# Patient Record
Sex: FEMALE | Race: WHITE | NOT HISPANIC OR LATINO | URBAN - METROPOLITAN AREA
[De-identification: names, ages, dates, MRNs, and addresses within clinical notes are randomized per-mention and may not be internally consistent; named-entity substitution may affect disease eponyms.]

---

## 2019-10-20 PROBLEM — Z01.419 WELL WOMAN EXAM: Status: ACTIVE | Noted: 2019-10-20

## 2019-10-23 ENCOUNTER — OFFICE VISIT (OUTPATIENT)
Dept: OBSTETRICS AND GYNECOLOGY | Facility: CLINIC | Age: 69
End: 2019-10-23

## 2019-10-23 VITALS — RESPIRATION RATE: 14 BRPM | SYSTOLIC BLOOD PRESSURE: 118 MMHG | DIASTOLIC BLOOD PRESSURE: 74 MMHG | WEIGHT: 146 LBS

## 2019-10-23 DIAGNOSIS — R35.0 URINARY FREQUENCY: ICD-10-CM

## 2019-10-23 DIAGNOSIS — N81.10 CYSTOCELE WITHOUT UTERINE PROLAPSE: Primary | ICD-10-CM

## 2019-10-23 PROBLEM — M85.89 OSTEOPENIA OF MULTIPLE SITES: Status: ACTIVE | Noted: 2017-02-08

## 2019-10-23 PROCEDURE — 99203 OFFICE O/P NEW LOW 30 MIN: CPT | Performed by: OBSTETRICS & GYNECOLOGY

## 2019-10-23 RX ORDER — MULTIVITAMIN
TABLET ORAL DAILY
COMMUNITY

## 2019-10-23 NOTE — PROGRESS NOTES
Subjective   Chief Complaint   Patient presents with   • Prolapsed bladder     Karen Napier is a 68 y.o. year old .  No LMP recorded (lmp unknown). Patient has had a hysterectomy.  She presents to be seen because of trouble she attributes to her prolapse.  The primary symptom she has is frequent nighttime urination.  She gets up several times during the evening.  This is been going on for many years now.  To date, no work-up or treatment has been tried.  She also notices prolapse at times.  She notices a slight protrusion through the vagina.  It is uncomfortable at times during the day.  Rarely leaks urine.  She generally does not need to lean forward to empty her bladder more frequently.  They are not sexually active.  She has some diverticular disease but no issues with colonic emptying.  Constipation is not a problem.    OTHER THINGS SHE WANTS TO DISCUSS TODAY:  Nothing else    The following portions of the patient's history were reviewed and updated as appropriate:current medications, allergies, past family history, past medical history, past social history and past surgical history    Social History    Tobacco Use      Smoking status: Never Smoker    Review of Systems  Constitutional POS: nothing reported    NEG: anorexia or night sweats   Genitourinary POS: see HPI    NEG: dysuria or hematuria   Gastointestinal POS: see HPI    NEG: bloating, change in bowel habits, melena or reflux symptoms   Integument POS: nothing reported    NEG: moles that are changing in size, shape, color or rashes   Breast POS: nothing reported    NEG: persistent breast lump, skin dimpling or nipple discharge         Objective   /74   Resp 14   Wt 66.2 kg (146 lb)   LMP  (LMP Unknown)   Breastfeeding? No     General:  well developed; well nourished  no acute distress   Pelvis: Clinical staff was present for exam  External genitalia:  normal appearance of the external genitalia including [place]th[place]'s and [place]'s glands.  :   urethral meatus normal;  Vaginal:  normal pink mucosa without prolapse or lesions.  Cervix:  absent.  Uterus:  absent.  Adnexa:  non palpable bilaterally.  Rectal:  digital rectal exam not performed; anus visually normal appearing.  Cystocele GRADE 3 without Valsalva; grade 4 with Valsalva  Rectocele GRADE 0  Vaginal vault prolapse GRADE 0     Lab Review   No data reviewed    Imaging   No data reviewed        Assessment   1. Normal GYN exam S/P hysterectomy with isolated cystocele  2. Urinary frequency with nocturia.  May be related to cystocele but may be unrelated     Plan   1. Trial of pessary  2. The following data needs to be obtained to update her medical records: last DEXA and last colonoscopy.  3. The importance of keeping all planned follow-up and taking all medications as prescribed was emphasized.  4. Follow up for pessary fitting          This note was electronically signed.    Brad Ware M.D.  October 23, 2019    Note: Speech recognition transcription software may have been used to create portions of this document.  An attempt at proofreading has been made but errors in transcription could still be present.

## 2019-11-18 ENCOUNTER — TELEPHONE (OUTPATIENT)
Dept: OBSTETRICS AND GYNECOLOGY | Facility: CLINIC | Age: 69
End: 2019-11-18

## 2019-11-18 NOTE — TELEPHONE ENCOUNTER
susan    Pt has a pessary, but has an ovarian screen in April at . Her question is, should she make an appt here some time before the ovarian screen to get pessary removed for that appt, or not?

## 2019-11-21 ENCOUNTER — OFFICE VISIT (OUTPATIENT)
Dept: OBSTETRICS AND GYNECOLOGY | Facility: CLINIC | Age: 69
End: 2019-11-21

## 2019-11-21 VITALS — DIASTOLIC BLOOD PRESSURE: 74 MMHG | WEIGHT: 146 LBS | RESPIRATION RATE: 14 BRPM | SYSTOLIC BLOOD PRESSURE: 122 MMHG

## 2019-11-21 DIAGNOSIS — N81.10 CYSTOCELE WITHOUT UTERINE PROLAPSE: Primary | ICD-10-CM

## 2019-11-21 DIAGNOSIS — Z46.89 PESSARY MAINTENANCE: ICD-10-CM

## 2019-11-21 PROCEDURE — 57160 INSERT PESSARY/OTHER DEVICE: CPT | Performed by: OBSTETRICS & GYNECOLOGY

## 2019-11-21 PROCEDURE — A4562 PESSARY, NON RUBBER,ANY TYPE: HCPCS | Performed by: OBSTETRICS & GYNECOLOGY

## 2019-11-21 NOTE — PROGRESS NOTES
Pessary insertion    Date of procedure:  11/21/2019    Risks and benefits discussed? yes  All questions answered? yes  Consents given by the patient  Written consent obtained? no    Pessary placed: Doughnut - #3       Pessary's attempted without good fit: Foldable ring w/ support - #4 w/o urethral bar     Post procedure instructions: Call ASAP if increasing pain or trouble passing urine or bowels    Follow up for pessary recheck 2 weeks    This note was electronically signed.    Brad Ware M.D.  November 21, 2019

## 2019-11-25 ENCOUNTER — TELEPHONE (OUTPATIENT)
Dept: OBSTETRICS AND GYNECOLOGY | Facility: CLINIC | Age: 69
End: 2019-11-25

## 2019-11-25 RX ORDER — SULFAMETHOXAZOLE AND TRIMETHOPRIM 400; 80 MG/1; MG/1
1 TABLET ORAL 2 TIMES DAILY
Qty: 10 TABLET | Refills: 0 | Status: SHIPPED | OUTPATIENT
Start: 2019-11-25 | End: 2019-11-30

## 2019-11-25 NOTE — TELEPHONE ENCOUNTER
Having new onset incontinence.  Feeling a little bit of pelvic pressure as well.  Overall the pessary is reasonably comfortable.  Suspect have unmasked incontinence from correction of the cystocele without mid urethral support.  Cannot exclude pessary induced UTI.  Empirically will try antibiotics for 5 days.  If symptoms do not resolve, may need to readdress pessary selection.    New Medications Ordered This Visit   Medications   • sulfamethoxazole-trimethoprim (BACTRIM,SEPTRA) 400-80 MG tablet     Sig: Take 1 tablet by mouth 2 (Two) Times a Day for 5 days.     Dispense:  10 tablet     Refill:  0

## 2019-11-25 NOTE — TELEPHONE ENCOUNTER
Dr Ware    Patient had pessary on Thursday and is having issues. Leakage and feeling pressure and would like to discuss.    Pt call back [phone]

## 2019-12-02 ENCOUNTER — OFFICE VISIT (OUTPATIENT)
Dept: OBSTETRICS AND GYNECOLOGY | Facility: CLINIC | Age: 69
End: 2019-12-02

## 2019-12-02 VITALS
HEIGHT: 63 IN | DIASTOLIC BLOOD PRESSURE: 78 MMHG | WEIGHT: 146.2 LBS | SYSTOLIC BLOOD PRESSURE: 132 MMHG | BODY MASS INDEX: 25.91 KG/M2

## 2019-12-02 DIAGNOSIS — Z46.89 PESSARY MAINTENANCE: ICD-10-CM

## 2019-12-02 DIAGNOSIS — N81.10 CYSTOCELE WITHOUT UTERINE PROLAPSE: Primary | ICD-10-CM

## 2019-12-02 DIAGNOSIS — N39.41 URGE INCONTINENCE: ICD-10-CM

## 2019-12-02 PROCEDURE — 99214 OFFICE O/P EST MOD 30 MIN: CPT | Performed by: OBSTETRICS & GYNECOLOGY

## 2019-12-02 PROCEDURE — 57160 INSERT PESSARY/OTHER DEVICE: CPT | Performed by: OBSTETRICS & GYNECOLOGY

## 2019-12-02 PROCEDURE — A4562 PESSARY, NON RUBBER,ANY TYPE: HCPCS | Performed by: OBSTETRICS & GYNECOLOGY

## 2019-12-02 NOTE — PROGRESS NOTES
"Subjective   Chief Complaint   Patient presents with   • Follow-up     pt c/o residual UTI symptoms and leaking with pessary.     Karen Napier is a 69 y.o. year old .  No LMP recorded (lmp unknown). Patient has had a hysterectomy.  She presents to be seen because of ongoing issues with leaking since the pessary was placed.  Antibiotics were given empirically.  Symptoms persist.  She has less issue during the evening but has more problem when she is up during the daytime.  Has noticed a little bit of blood at the vaginal opening on her pad.  Also at times feels pressure.    OTHER THINGS SHE WANTS TO DISCUSS TODAY:  Nothing else    The following portions of the patient's history were reviewed and updated as appropriate:no additional history reviewed    Social History    Tobacco Use      Smoking status: Never Smoker    Review of Systems  Constitutional POS: nothing reported    NEG: anorexia or night sweats   Genitourinary POS: see HPI    NEG: dysuria or hematuria   Gastointestinal POS: nothing reported    NEG: bloating, change in bowel habits, melena or reflux symptoms   Integument POS: nothing reported    NEG: moles that are changing in size, shape, color or rashes   Breast POS: nothing reported    NEG: persistent breast lump, skin dimpling or nipple discharge         Objective   /78   Ht 160 cm (63\")   Wt 66.3 kg (146 lb 3.2 oz)   LMP  (LMP Unknown)   Breastfeeding? No   BMI 25.90 kg/m²     General:  well developed; well nourished  no acute distress   Pelvis: Clinical staff was present for exam  External genitalia:  normal appearance of the external genitalia including Bartholin's and [place]'s glands.  :  urethral meatus normal;  Vaginal:  Donut pessary appears appropriately placed     Lab Review   No data reviewed    Imaging   No data reviewed        Assessment   1. Urinary incontinence new since placement of pessary.  Persists despite empiric antibiotic use.  Suspect due to unmasked incontinence but " cannot exclude UTI or detrusor instability     Plan   1. Pessary removed and not replaced  2. The importance of keeping all planned follow-up and taking all medications as prescribed was emphasized.  3. The following tests were ordered today: UA with culture if indicated.  It was explained to Karen that all lab test should be back within the one week after they are performed. She will be notified about the results, regardless of the findings. If she has not been contacted by the office within 2 weeks after the test has been performed, it is her responsibility to contact us to learn about her results.  4. Follow up for pessary fitting today         This note was electronically signed.    Brad Ware M.D.  December 2, 2019    Note: Speech recognition transcription software may have been used to create portions of this document.  An attempt at proofreading has been made but errors in transcription could still be present.

## 2019-12-02 NOTE — PROGRESS NOTES
Pessary insertion    Date of procedure:  12/2/2019    Risks and benefits discussed? yes  All questions answered? yes  Consents given by the patient  Written consent obtained? no    Pessary placed: Incontinence dish - #3 w/ urethral bar       Pessary's attempted without good fit: None     Post procedure instructions: Call ASAP if increasing pain or trouble passing urine or bowels    Follow up for recheck of symptoms 2 weeks    This note was electronically signed.    Brad Ware M.D.  December 2, 2019

## 2019-12-03 LAB
APPEARANCE UR: CLEAR
BACTERIA #/AREA URNS HPF: NORMAL /[HPF]
BILIRUB UR QL STRIP: NEGATIVE
COLOR UR: YELLOW
EPI CELLS #/AREA URNS HPF: NORMAL /HPF
GLUCOSE UR QL: NEGATIVE
HGB UR QL STRIP: ABNORMAL
KETONES UR QL STRIP: NEGATIVE
LEUKOCYTE ESTERASE UR QL STRIP: NEGATIVE
Lab: NORMAL
MICRO URNS: ABNORMAL
MUCOUS THREADS URNS QL MICRO: PRESENT
NITRITE UR QL STRIP: NEGATIVE
PH UR STRIP: 5 [PH] (ref 5–7.5)
PROT UR QL STRIP: NEGATIVE
RBC #/AREA URNS HPF: NORMAL /HPF
SP GR UR: 1.01 (ref 1–1.03)
URINALYSIS REFLEX: ABNORMAL
UROBILINOGEN UR STRIP-MCNC: 0.2 MG/DL (ref 0.2–1)
WBC #/AREA URNS HPF: NORMAL /HPF

## 2019-12-06 ENCOUNTER — OFFICE VISIT (OUTPATIENT)
Dept: OBSTETRICS AND GYNECOLOGY | Facility: CLINIC | Age: 69
End: 2019-12-06

## 2019-12-06 VITALS
BODY MASS INDEX: 26.05 KG/M2 | WEIGHT: 147 LBS | DIASTOLIC BLOOD PRESSURE: 74 MMHG | SYSTOLIC BLOOD PRESSURE: 122 MMHG | HEIGHT: 63 IN

## 2019-12-06 DIAGNOSIS — Z46.89 PESSARY MAINTENANCE: Primary | ICD-10-CM

## 2019-12-06 PROCEDURE — 99213 OFFICE O/P EST LOW 20 MIN: CPT | Performed by: OBSTETRICS & GYNECOLOGY

## 2021-01-25 ENCOUNTER — OFFICE VISIT (OUTPATIENT)
Dept: ENDOCRINOLOGY | Facility: CLINIC | Age: 71
End: 2021-01-25

## 2021-01-25 VITALS
OXYGEN SATURATION: 96 % | WEIGHT: 149 LBS | HEART RATE: 70 BPM | BODY MASS INDEX: 27.42 KG/M2 | HEIGHT: 62 IN | SYSTOLIC BLOOD PRESSURE: 124 MMHG | DIASTOLIC BLOOD PRESSURE: 68 MMHG | TEMPERATURE: 97.3 F

## 2021-01-25 DIAGNOSIS — E04.2 MULTINODULAR GOITER: Primary | ICD-10-CM

## 2021-01-25 PROCEDURE — 99203 OFFICE O/P NEW LOW 30 MIN: CPT | Performed by: INTERNAL MEDICINE

## 2021-01-25 PROCEDURE — 76536 US EXAM OF HEAD AND NECK: CPT | Performed by: INTERNAL MEDICINE

## 2021-01-25 RX ORDER — CETIRIZINE HYDROCHLORIDE 5 MG/1
5 TABLET ORAL DAILY
COMMUNITY

## 2021-01-25 NOTE — PROGRESS NOTES
"     Office Note      Date: 2021  Patient Name: Karen Napier  MRN: [id number]  : 1950    Chief Complaint   Patient presents with   • Thyroid Problem       History of Present Illness:   Karen Napier is a 70 y.o. female who presents for Thyroid Problem    She isn't taking any thyroid meds. She denies any excess iodine intake. She denies any  recent steroid use. She hasn't noted any change in the size of her neck. She denies any  compressive sxs. She denies any sxs of hypo- or hyperthyroidism at this time.     Subjective      Review of Systems:   Review of Systems   Constitutional: Negative.    Cardiovascular: Negative.    Gastrointestinal: Negative.    Endocrine: Negative.        The following portions of the patient's history were reviewed and updated as appropriate: allergies, current medications, past family history, past medical history, past social history, past surgical history and problem list.    Objective     Visit Vitals  /68 (BP Location: Right arm, Patient Position: Sitting, Cuff Size: Adult)   Pulse 70   Temp 97.3 °F (36.3 °C) (Infrared)   Ht 157.5 cm (62\")   Wt 67.6 kg (149 lb)   LMP  (LMP Unknown)   SpO2 96%   BMI 27.25 kg/m²       Physical Exam:  Physical Exam  Constitutional:       Appearance: Normal appearance.   Neurological:      Mental Status: She is alert.         Labs:    TSH  No results found for: TSHBASE     Free T4  No results found for: FREET4    T3  No results found for: [id number]      TPO  No results found for: THYROIDAB    TG AB  No results found for: THGAB    TG  No results found for: THYROGLB    CBC w/DIFF  No results found for: WBC, RBC, HGB, HCT, MCV, MCH, MCHC, RDW, RDWSD, MPV, PLT, NEUTRORELPCT, LYMPHORELPCT, MONORELPCT, EOSRELPCT, BASORELPCT, AUTOIGPER, NEUTROABS, LYMPHSABS, MONOSABS, EOSABS, BASOSABS, AUTOIGNUM, NRBC        Assessment / Plan      Assessment & Plan:  Problem List Items Addressed This Visit        Endocrine and Metabolic    Multinodular goiter - Primary "    Current Assessment & Plan     A neck u/s was performed today.  This revealed a hypoechoic nodule in the left lobe that was stable in size compared to u/s from 1/2020.  Also a cyst was seen in the right lobe that was stable.  No increased blood flow was seen in the solid nodule.  No abnormal lymph nodes were seen.    Check TFTs today.         Relevant Orders    TSH    T4, Free    US Thyroid (Completed)           Return in about 1 year (around 1/25/2022) for Recheck with TSH, free T4.    Dago Padgett MD   01/25/2021

## 2021-01-25 NOTE — ASSESSMENT & PLAN NOTE
A neck u/s was performed today.  This revealed a hypoechoic nodule in the left lobe that was stable in size compared to u/s from 1/2020.  Also a cyst was seen in the right lobe that was stable.  No increased blood flow was seen in the solid nodule.  No abnormal lymph nodes were seen.    Check TFTs today.

## 2021-01-26 LAB
T4 FREE SERPL-MCNC: 1.14 NG/DL (ref 0.93–1.7)
TSH SERPL DL<=0.005 MIU/L-ACNC: 2.37 UIU/ML (ref 0.27–4.2)

## 2022-01-26 ENCOUNTER — LAB (OUTPATIENT)
Dept: LAB | Facility: HOSPITAL | Age: 72
End: 2022-01-26

## 2022-01-26 ENCOUNTER — OFFICE VISIT (OUTPATIENT)
Dept: ENDOCRINOLOGY | Facility: CLINIC | Age: 72
End: 2022-01-26

## 2022-01-26 VITALS
SYSTOLIC BLOOD PRESSURE: 126 MMHG | HEART RATE: 73 BPM | WEIGHT: 150 LBS | DIASTOLIC BLOOD PRESSURE: 70 MMHG | BODY MASS INDEX: 27.6 KG/M2 | HEIGHT: 62 IN | OXYGEN SATURATION: 96 %

## 2022-01-26 DIAGNOSIS — E04.2 MULTINODULAR GOITER: Primary | ICD-10-CM

## 2022-01-26 LAB — TSH SERPL-ACNC: 2.1 UIU/ML (ref 0.27–4.2)

## 2022-01-26 PROCEDURE — 99213 OFFICE O/P EST LOW 20 MIN: CPT | Performed by: INTERNAL MEDICINE

## 2022-01-26 NOTE — PROGRESS NOTES
"     Office Note      Date: 2022  Patient Name: Karen Napier  MRN: [id number]  : 1950    Chief Complaint   Patient presents with   • Goiter       History of Present Illness:   Karen Napier is a 71 y.o. female who presents for Goiter    She isn't taking any thyroid meds. She denies any excess iodine intake. She denies any recent steroid use. She hasn't noted any change in the size of her neck. She denies any compressive sxs. She denies any sxs of hypo- or hyperthyroidism at this time.     A neck u/s was performed last visit that showed stable 1.8cm left thyroid nodule and right thyroid cyst.      She had her  had COVID-19 infection about a month ago.    Subjective      Review of Systems:   Review of Systems   Constitutional: Negative.    Cardiovascular: Negative.    Gastrointestinal: Negative.    Endocrine: Negative.        The following portions of the patient's history were reviewed and updated as appropriate: allergies, current medications, past family history, past medical history, past social history, past surgical history and problem list.    Objective     Visit Vitals  /70   Pulse 73   Ht 157.5 cm (62\")   Wt 68 kg (150 lb)   LMP  (LMP Unknown)   SpO2 96%   BMI 27.44 kg/m²       Physical Exam:  Physical Exam  Constitutional:       Appearance: Normal appearance.   Neck:      Thyroid: No thyroid mass, thyromegaly or thyroid tenderness.      Comments: Thyroid firm but normal size with no palpable nodules  Lymphadenopathy:      Cervical: No cervical adenopathy.   Neurological:      Mental Status: She is alert.         Labs:    TSH  No results found for: TSHBASE     Free T4  Free T4   Date Value Ref Range Status   2021 1.14 0.93 - 1.70 ng/dL Final     Comment:     Results may be falsely increased if patient taking Biotin.       T3  No results found for: [id number]      TPO  No results found for: THYROIDAB    TG AB  No results found for: THGAB    TG  No results found for: THYROGLB    CBC " w/DIFF  No results found for: WBC, RBC, HGB, HCT, MCV, MCH, MCHC, RDW, RDWSD, MPV, PLT, NEUTRORELPCT, LYMPHORELPCT, MONORELPCT, EOSRELPCT, BASORELPCT, AUTOIGPER, NEUTROABS, LYMPHSABS, MONOSABS, EOSABS, BASOSABS, AUTOIGNUM, NRBC        Assessment / Plan      Assessment & Plan:  Diagnoses and all orders for this visit:    1. Multinodular goiter (Primary)  Assessment & Plan:  Check TFTs today.    Neck u/s last year was stable.  Plan for another u/s in a year.    Orders:  -     TSH; Future      Return in about 1 year (around 1/26/2023) for Recheck with TSH, neck u/s.    Dago Padgett MD   01/26/2022

## 2023-02-13 ENCOUNTER — OFFICE VISIT (OUTPATIENT)
Dept: ENDOCRINOLOGY | Facility: CLINIC | Age: 73
End: 2023-02-13
Payer: MEDICARE

## 2023-02-13 VITALS
HEIGHT: 62 IN | DIASTOLIC BLOOD PRESSURE: 76 MMHG | BODY MASS INDEX: 27.23 KG/M2 | WEIGHT: 148 LBS | OXYGEN SATURATION: 97 % | HEART RATE: 49 BPM | SYSTOLIC BLOOD PRESSURE: 120 MMHG

## 2023-02-13 DIAGNOSIS — E04.2 MULTINODULAR GOITER: Primary | ICD-10-CM

## 2023-02-13 PROCEDURE — 36415 COLL VENOUS BLD VENIPUNCTURE: CPT | Performed by: INTERNAL MEDICINE

## 2023-02-13 PROCEDURE — 76536 US EXAM OF HEAD AND NECK: CPT | Performed by: INTERNAL MEDICINE

## 2023-02-13 PROCEDURE — 99213 OFFICE O/P EST LOW 20 MIN: CPT | Performed by: INTERNAL MEDICINE

## 2023-02-13 RX ORDER — EPINEPHRINE 0.3 MG/.3ML
INJECTION SUBCUTANEOUS ONCE
COMMUNITY
Start: 2022-11-30

## 2023-02-13 NOTE — PROGRESS NOTES
"     Office Note      Date: 2023  Patient Name: Karen Napier  MRN: [id number]  : 1950    Chief Complaint   Patient presents with   • multinodular goiter       History of Present Illness:   Karen Napier is a 72 y.o. female who presents for multinodular goiter    She isn't taking any thyroid meds. She denies any excess iodine intake. She denies any recent steroid use. She hasn't noted any change in the size of her neck. She denies any compressive sxs. She denies any sxs of hypo- or hyperthyroidism at this time.      A neck u/s was performed 2 years ago that showed stable 1.8cm left thyroid nodule and right thyroid cyst.      Subjective      Review of Systems:   Review of Systems   Constitutional: Negative.    Cardiovascular: Negative.    Gastrointestinal: Negative.    Endocrine: Negative.        The following portions of the patient's history were reviewed and updated as appropriate: allergies, current medications, past family history, past medical history, past social history, past surgical history and problem list.    Objective     Visit Vitals  /76   Pulse (!) 49   Ht 157.5 cm (62\")   Wt 67.1 kg (148 lb)   LMP  (LMP Unknown)   SpO2 97%   BMI 27.07 kg/m²       Physical Exam:  Physical Exam  Constitutional:       Appearance: Normal appearance.   Neurological:      Mental Status: She is alert.         Labs:    TSH  No results found for: TSHBASE     Free T4  Free T4   Date Value Ref Range Status   2021 1.14 0.93 - 1.70 ng/dL Final     Comment:     Results may be falsely increased if patient taking Biotin.       T3  No results found for: [id number]      TPO  No results found for: THYROIDAB    TG AB  No results found for: THGAB    TG  No results found for: THYROGLB    CBC w/DIFF  No results found for: WBC, RBC, HGB, HCT, MCV, MCH, MCHC, RDW, RDWSD, MPV, PLT, NEUTRORELPCT, LYMPHORELPCT, MONORELPCT, EOSRELPCT, BASORELPCT, AUTOIGPER, NEUTROABS, LYMPHSABS, MONOSABS, EOSABS, BASOSABS, AUTOIGNUM, " [facility]        Assessment / Plan      Assessment & Plan:  Diagnoses and all orders for this visit:    1. Multinodular goiter (Primary)  Assessment & Plan:  Check TSH today.  Will send note about results.    A neck u/s was performed today.  This revealed a solid hypoechoic nodule in the left thyroid lobe.  This measured 1.8cm.  There was a cyst in the right lobe that measured 1.3cm.  There were a couple of tiny cysts in the left lobe also.  No abnormal lymph nodes were seen.    This appears stable compared to u/s from 1/2021.    Plan for another u/s in 2 years.    Orders:  -     TSH; Future  -     US Thyroid    Current Outpatient Medications   Medication Instructions   • cetirizine (ZYRTEC) 5 mg, Oral, Daily   • Cholecalciferol 1.25 MG (10009 UT) tablet Oral   • EPINEPHrine (EPIPEN) 0.3 MG/0.3ML solution auto-injector injection Once   • FIBER PO Oral, Daily   • multivitamin (DAILY WAYNE) tablet tablet Oral, Daily   • Probiotic Product (PROBIOTIC DAILY PO) Oral   • TURMERIC PO Oral, 1 po bid    • VITAMIN D PO Oral      Return in about 1 year (around 2/13/2024) for Recheck with TSH.    Dago Padgett MD   02/13/2023

## 2023-02-13 NOTE — ASSESSMENT & PLAN NOTE
Check TSH today.  Will send note about results.    A neck u/s was performed today.  This revealed a solid hypoechoic nodule in the left thyroid lobe.  This measured 1.8cm.  There was a cyst in the right lobe that measured 1.3cm.  There were a couple of tiny cysts in the left lobe also.  No abnormal lymph nodes were seen.    This appears stable compared to u/s from 1/2021.    Plan for another u/s in 2 years.

## 2023-02-14 LAB — TSH SERPL DL<=0.005 MIU/L-ACNC: 2.02 UIU/ML (ref 0.27–4.2)

## 2024-08-21 ENCOUNTER — OFFICE VISIT (OUTPATIENT)
Dept: ENDOCRINOLOGY | Facility: CLINIC | Age: 74
End: 2024-08-21
Payer: MEDICARE

## 2024-08-21 VITALS
SYSTOLIC BLOOD PRESSURE: 116 MMHG | OXYGEN SATURATION: 94 % | DIASTOLIC BLOOD PRESSURE: 72 MMHG | WEIGHT: 142 LBS | HEIGHT: 62 IN | HEART RATE: 91 BPM | BODY MASS INDEX: 26.13 KG/M2

## 2024-08-21 DIAGNOSIS — E04.2 MULTINODULAR GOITER: Primary | ICD-10-CM

## 2024-08-21 PROCEDURE — 36415 COLL VENOUS BLD VENIPUNCTURE: CPT | Performed by: INTERNAL MEDICINE

## 2024-08-21 PROCEDURE — 1160F RVW MEDS BY RX/DR IN RCRD: CPT | Performed by: INTERNAL MEDICINE

## 2024-08-21 PROCEDURE — 99213 OFFICE O/P EST LOW 20 MIN: CPT | Performed by: INTERNAL MEDICINE

## 2024-08-21 PROCEDURE — 84443 ASSAY THYROID STIM HORMONE: CPT | Performed by: INTERNAL MEDICINE

## 2024-08-21 PROCEDURE — 1159F MED LIST DOCD IN RCRD: CPT | Performed by: INTERNAL MEDICINE

## 2024-08-21 NOTE — ASSESSMENT & PLAN NOTE
Neck exam is stable.  Plan for another u/s in about 6 months.    Check TSH today.  She has been euthyroid.

## 2024-08-21 NOTE — PROGRESS NOTES
"     Office Note      Date: 2024  Patient Name: Karen Napier  MRN: [id number]  : 1950    Chief Complaint   Patient presents with    Goiter       History of Present Illness:   Karen Napier is a 73 y.o. female who presents for Goiter    She isn't taking any thyroid meds. She denies any excess iodine intake. She denies any recent steroid use. She hasn't noted any change in the size of her neck. She denies any compressive sxs. She denies any sxs of hypo- or hyperthyroidism at this time.      Neck u/s last visit showed stable thyroid nodules.    Subjective      Review of Systems:   Review of Systems   Constitutional: Negative.    Cardiovascular: Negative.    Gastrointestinal: Negative.    Endocrine: Negative.        The following portions of the patient's history were reviewed and updated as appropriate: allergies, current medications, past family history, past medical history, past social history, past surgical history, and problem list.    Objective     Visit Vitals  /72   Pulse 91   Ht 157.5 cm (62\")   Wt 64.4 kg (142 lb)   LMP  (LMP Unknown)   SpO2 94%   BMI 25.97 kg/m²       Physical Exam:  Physical Exam  Constitutional:       Appearance: Normal appearance.   Neck:      Thyroid: No thyroid mass, thyromegaly or thyroid tenderness.   Lymphadenopathy:      Cervical: No cervical adenopathy.   Neurological:      Mental Status: She is alert.         Labs:    TSH  No results found for: \"TSHBASE\"     Free T4  Free T4   Date Value Ref Range Status   2021 1.14 0.93 - 1.70 ng/dL Final     Comment:     Results may be falsely increased if patient taking Biotin.       T3  No results found for: \"[id number]\"      TPO  No results found for: \"THYROIDAB\"    TG AB  No results found for: \"THGAB\"    TG  No results found for: \"THYROGLB\"    CBC w/DIFF  No results found for: \"WBC\", \"RBC\", \"HGB\", \"HCT\", \"MCV\", \"MCH\", \"MCHC\", \"RDW\", \"RDWSD\", \"MPV\", \"PLT\", \"NEUTRORELPCT\", \"LYMPHORELPCT\", \"MONORELPCT\", \"EOSRELPCT\", " "\"BASORELPCT\", \"AUTOIGPER\", \"NEUTROABS\", \"LYMPHSABS\", \"MONOSABS\", \"EOSABS\", \"BASOSABS\", \"AUTOIGNUM\", \"NRBC\"        Assessment / Plan      Assessment & Plan:  Diagnoses and all orders for this visit:    1. Multinodular goiter (Primary)  Assessment & Plan:  Neck exam is stable.  Plan for another u/s in about 6 months.    Check TSH today.  She has been euthyroid.    Orders:  -     TSH; Future      Current Outpatient Medications   Medication Instructions    cetirizine (ZYRTEC) 5 mg, Oral, Daily    Cholecalciferol 1.25 MG (89562 UT) tablet Oral    EPINEPHrine (EPIPEN) 0.3 MG/0.3ML solution auto-injector injection Once    FIBER PO Oral, Daily    multivitamin (DAILY WAYNE) tablet tablet Oral, Daily    Probiotic Product (PROBIOTIC DAILY PO) Oral    TURMERIC PO Oral, 1 po bid     VITAMIN D PO Oral      Return in about 6 months (around 2/21/2025) for Recheck with TSH.    Electronically signed by: Dago Padgett MD  08/21/2024  "

## 2024-08-22 LAB — TSH SERPL DL<=0.05 MIU/L-ACNC: 2.59 UIU/ML (ref 0.27–4.2)

## 2025-06-18 NOTE — PROGRESS NOTES
"Subjective   Chief Complaint   Patient presents with   • Follow-up     pt c/o uncomfortable pessary.      Karen Napier is a 69 y.o. year old .  No LMP recorded (lmp unknown). Patient has had a hysterectomy.  She presents to be seen because of pt c/o uncomfortable pessary. Pt states that she has had some urinary leakage when up and moving but none when she is laying down. feels alot of pressure and is uncomfortable when she sits down. pt also states that she did have some blood last night but not sure if it is from the vagina or the urine leakage. pt would like to take a break and start this process back up after ayden.    OTHER THINGS SHE WANTS TO DISCUSS TODAY:  Nothing else    The following portions of the patient's history were reviewed and updated as appropriate:no additional history reviewed    Social History    Tobacco Use      Smoking status: Never Smoker    Review of Systems  Constitutional POS: nothing reported    NEG: anorexia or night sweats   Genitourinary POS: see HPI    NEG: dysuria or hematuria   Gastointestinal POS: nothing reported    NEG: bloating, change in bowel habits, melena or reflux symptoms   Integument POS: nothing reported    NEG: moles that are changing in size, shape, color or rashes   Breast POS: nothing reported    NEG: persistent breast lump, skin dimpling or nipple discharge         Objective   /74   Ht 160 cm (63\")   Wt 66.7 kg (147 lb)   LMP  (LMP Unknown)   Breastfeeding? No   BMI 26.04 kg/m²     General:  well developed; well nourished  no acute distress   Skin:  No suspicious lesions seen   Pelvis: Clinical staff was present for exam   Pessary was removed without difficulty     Lab Review   No data reviewed    Imaging   No data reviewed        Assessment   pelvic organ prolapse - not significantly different from prior exams.  Suboptimal relief with current pessary     Plan   1. Pessary was removed, cleaned and not replaced  2. The importance of keeping all " Allergies and home medications reviewed/updated with patient and patient spouse   planned follow-up and taking all medications as prescribed was emphasized.  3. Follow up reattempt the pessary.  Anticipate may be best served with a [place] pessary with urethral bar         This note was electronically signed.    Brad Ware M.D.  December 6, 2019    Note: Speech recognition transcription software may have been used to create portions of this document.  An attempt at proofreading has been made but errors in transcription could still be present.